# Patient Record
Sex: MALE | Race: BLACK OR AFRICAN AMERICAN | NOT HISPANIC OR LATINO | URBAN - METROPOLITAN AREA
[De-identification: names, ages, dates, MRNs, and addresses within clinical notes are randomized per-mention and may not be internally consistent; named-entity substitution may affect disease eponyms.]

---

## 2017-03-15 ENCOUNTER — EMERGENCY (EMERGENCY)
Facility: HOSPITAL | Age: 32
LOS: 1 days | Discharge: HOME | End: 2017-03-15
Attending: EMERGENCY MEDICINE | Admitting: EMERGENCY MEDICINE
Payer: COMMERCIAL

## 2017-03-15 VITALS
SYSTOLIC BLOOD PRESSURE: 152 MMHG | TEMPERATURE: 100 F | RESPIRATION RATE: 18 BRPM | WEIGHT: 164.91 LBS | DIASTOLIC BLOOD PRESSURE: 98 MMHG | OXYGEN SATURATION: 98 % | HEART RATE: 98 BPM

## 2017-03-15 VITALS
HEART RATE: 90 BPM | DIASTOLIC BLOOD PRESSURE: 86 MMHG | SYSTOLIC BLOOD PRESSURE: 144 MMHG | OXYGEN SATURATION: 99 % | TEMPERATURE: 98 F | RESPIRATION RATE: 18 BRPM

## 2017-03-15 DIAGNOSIS — R59.9 ENLARGED LYMPH NODES, UNSPECIFIED: ICD-10-CM

## 2017-03-15 DIAGNOSIS — R50.9 FEVER, UNSPECIFIED: ICD-10-CM

## 2017-03-15 LAB
ALBUMIN SERPL ELPH-MCNC: 3.8 G/DL — SIGNIFICANT CHANGE UP (ref 3.4–5)
ALP SERPL-CCNC: 100 U/L — SIGNIFICANT CHANGE UP (ref 40–120)
ALT FLD-CCNC: 65 U/L — HIGH (ref 12–42)
ANION GAP SERPL CALC-SCNC: 8 MMOL/L — LOW (ref 9–16)
APTT BLD: 33.8 SEC — SIGNIFICANT CHANGE UP (ref 27.5–37.4)
AST SERPL-CCNC: 35 U/L — SIGNIFICANT CHANGE UP (ref 15–37)
BILIRUB SERPL-MCNC: 0.5 MG/DL — SIGNIFICANT CHANGE UP (ref 0.2–1.2)
BLD GP AB SCN SERPL QL: NEGATIVE — SIGNIFICANT CHANGE UP
BUN SERPL-MCNC: 11 MG/DL — SIGNIFICANT CHANGE UP (ref 7–23)
CALCIUM SERPL-MCNC: 9.4 MG/DL — SIGNIFICANT CHANGE UP (ref 8.5–10.5)
CHLORIDE SERPL-SCNC: 101 MMOL/L — SIGNIFICANT CHANGE UP (ref 96–108)
CO2 SERPL-SCNC: 27 MMOL/L — SIGNIFICANT CHANGE UP (ref 22–31)
CREAT SERPL-MCNC: 1.17 MG/DL — SIGNIFICANT CHANGE UP (ref 0.5–1.3)
GLUCOSE SERPL-MCNC: 88 MG/DL — SIGNIFICANT CHANGE UP (ref 70–99)
HCT VFR BLD CALC: 44.2 % — SIGNIFICANT CHANGE UP (ref 39–50)
HGB BLD-MCNC: 14.9 G/DL — SIGNIFICANT CHANGE UP (ref 13–17)
INR BLD: 1.23 — HIGH (ref 0.88–1.16)
MCHC RBC-ENTMCNC: 26.8 PG — LOW (ref 27–34)
MCHC RBC-ENTMCNC: 33.7 G/DL — SIGNIFICANT CHANGE UP (ref 32–36)
MCV RBC AUTO: 79.5 FL — LOW (ref 80–100)
PLATELET # BLD AUTO: 208 K/UL — SIGNIFICANT CHANGE UP (ref 150–400)
POTASSIUM SERPL-MCNC: 4 MMOL/L — SIGNIFICANT CHANGE UP (ref 3.5–5.3)
POTASSIUM SERPL-SCNC: 4 MMOL/L — SIGNIFICANT CHANGE UP (ref 3.5–5.3)
PROT SERPL-MCNC: 8.2 G/DL — SIGNIFICANT CHANGE UP (ref 6.4–8.2)
PROTHROM AB SERPL-ACNC: 13.7 SEC — HIGH (ref 10–13.1)
RAPID RVP RESULT: SIGNIFICANT CHANGE UP
RBC # BLD: 5.56 M/UL — SIGNIFICANT CHANGE UP (ref 4.2–5.8)
RBC # FLD: 12.5 % — SIGNIFICANT CHANGE UP (ref 10.3–16.9)
RH IG SCN BLD-IMP: POSITIVE — SIGNIFICANT CHANGE UP
SODIUM SERPL-SCNC: 136 MMOL/L — SIGNIFICANT CHANGE UP (ref 135–145)
WBC # BLD: 8.2 K/UL — SIGNIFICANT CHANGE UP (ref 3.8–10.5)
WBC # FLD AUTO: 8.2 K/UL — SIGNIFICANT CHANGE UP (ref 3.8–10.5)

## 2017-03-15 PROCEDURE — 71020: CPT | Mod: 26

## 2017-03-15 PROCEDURE — 86900 BLOOD TYPING SEROLOGIC ABO: CPT

## 2017-03-15 PROCEDURE — 86901 BLOOD TYPING SEROLOGIC RH(D): CPT

## 2017-03-15 PROCEDURE — 99284 EMERGENCY DEPT VISIT MOD MDM: CPT | Mod: 25

## 2017-03-15 PROCEDURE — 87798 DETECT AGENT NOS DNA AMP: CPT

## 2017-03-15 PROCEDURE — 87633 RESP VIRUS 12-25 TARGETS: CPT

## 2017-03-15 PROCEDURE — 80053 COMPREHEN METABOLIC PANEL: CPT

## 2017-03-15 PROCEDURE — 74177 CT ABD & PELVIS W/CONTRAST: CPT | Mod: 26

## 2017-03-15 PROCEDURE — 74177 CT ABD & PELVIS W/CONTRAST: CPT

## 2017-03-15 PROCEDURE — 86850 RBC ANTIBODY SCREEN: CPT

## 2017-03-15 PROCEDURE — 87581 M.PNEUMON DNA AMP PROBE: CPT

## 2017-03-15 PROCEDURE — 71046 X-RAY EXAM CHEST 2 VIEWS: CPT

## 2017-03-15 PROCEDURE — 85610 PROTHROMBIN TIME: CPT

## 2017-03-15 PROCEDURE — 36415 COLL VENOUS BLD VENIPUNCTURE: CPT

## 2017-03-15 PROCEDURE — 85027 COMPLETE CBC AUTOMATED: CPT

## 2017-03-15 PROCEDURE — 87486 CHLMYD PNEUM DNA AMP PROBE: CPT

## 2017-03-15 PROCEDURE — 85730 THROMBOPLASTIN TIME PARTIAL: CPT

## 2017-03-15 RX ORDER — IBUPROFEN 200 MG
600 TABLET ORAL ONCE
Qty: 0 | Refills: 0 | Status: COMPLETED | OUTPATIENT
Start: 2017-03-15 | End: 2017-03-15

## 2017-03-15 RX ORDER — ACETAMINOPHEN 500 MG
650 TABLET ORAL ONCE
Qty: 0 | Refills: 0 | Status: COMPLETED | OUTPATIENT
Start: 2017-03-15 | End: 2017-03-15

## 2017-03-15 RX ORDER — IOHEXOL 300 MG/ML
50 INJECTION, SOLUTION INTRAVENOUS ONCE
Qty: 0 | Refills: 0 | Status: COMPLETED | OUTPATIENT
Start: 2017-03-15 | End: 2017-03-15

## 2017-03-15 RX ADMIN — Medication 600 MILLIGRAM(S): at 23:09

## 2017-03-15 RX ADMIN — Medication 600 MILLIGRAM(S): at 18:00

## 2017-03-15 RX ADMIN — Medication 100 MILLIGRAM(S): at 23:09

## 2017-03-15 RX ADMIN — IOHEXOL 50 MILLILITER(S): 300 INJECTION, SOLUTION INTRAVENOUS at 20:25

## 2017-03-15 RX ADMIN — Medication 650 MILLIGRAM(S): at 18:00

## 2017-03-15 NOTE — ED PROVIDER NOTE - MEDICAL DECISION MAKING DETAILS
31y male with flu-like symptoms - will check flu swab and CXR for infiltrate. R inguinal hernia noted - pt tolerates attempted reduction poorly, tylenol/motrin ordered, ice pack applied; will re-attempt. If unsuccesful, will CT.

## 2017-03-15 NOTE — ED ADULT NURSE NOTE - OBJECTIVE STATEMENT
Pt walked into ED with c/o of right inguinal 9/10 pain with a bulge noted. Pt also has a dry cough fever, chills. Onset was 2 days ago. PT. took tylenols PO last night with no relief noted. Denies hematuria, penile dx, blood in stool, dysuria.

## 2017-03-15 NOTE — ED PROVIDER NOTE - GASTROINTESTINAL, MLM
Abdomen soft, non-tender, no guarding. R groin: palpable hernia inguinal area, slight reduction with compression but pt tolerates procedure poorly (ice packed applied)

## 2017-03-15 NOTE — ED PROVIDER NOTE - PROGRESS NOTE DETAILS
CT noted. D/w pt results. Will check for STIs though pt denies risk factors Rec warm compresses, doxycycline, referrals coordinator to obtain PMD for further w/u of this.

## 2017-03-15 NOTE — ED PROVIDER NOTE - OBJECTIVE STATEMENT
31y male no PMH with 2d subjective fever, chills, cough, body aches. No flu shot this year. (+)sick conact - gf with URI. Also notes painful lump in right groin for past few days. No h/o hernia. No dysuria/penile DC.

## 2017-03-17 LAB
C TRACH RRNA SPEC QL NAA+PROBE: SIGNIFICANT CHANGE UP
N GONORRHOEA RRNA SPEC QL NAA+PROBE: SIGNIFICANT CHANGE UP
SPECIMEN SOURCE: SIGNIFICANT CHANGE UP

## 2020-10-06 NOTE — ED PROVIDER NOTE - CPE EDP MUSC NORM
Referred by: Kj Singh MD; Medical Diagnosis (from order):      Physical Therapy -  Daily Treatment Note    Visit:  2     SUBJECTIVE                                                                                                             Patient states that his left foot was doing good for a while because he wasn't on it as much. However, he's been more on his feet lately and its starting to act up again. Patient states he has not been doing his exercises as much as he should. He has been icing some.     Pain / Symptoms:  Pain rating (out of 10): Current: 5   Location: Left heel and arch     OBJECTIVE                                                                                                                       Muscle Flexibility:   - Plantar Flexors: Left: moderate limitation Supine ankle dorsiflexion: with knee extended: 2° with knee flexed: 10° Right: normal Supine ankle dorsiflexion: with knee extended: 10° with knee flexed: 12°      TREATMENT                                                                                                                  Therapeutic Exercise:  Seated great toe extension stretch: 35\" x 3 L  Seated arch cupping: 10 x 5\" bilaterally  Standing gastrocnemius stretch: 30\" x 2 L    Patient instructed in the importance of resuming his exercise program 3x/day.     Manual Therapy:  Soft tissue mobilization along left arch with patient in supine.     Therapeutic Activity:  Patient fitted for spenco inserts in work boots. Patient instructed to start wearing these for 2 hours and slowly increase wear time to 1-2 hours each day until he is wearing them for an entire work shift. Patient instructed to resume icing more consistently. He should use a frozen water bottle and roll along the bottom of his foot for 5 minutes at a time.     Skilled input: verbal instruction/cues, tactile instruction/cues and as detailed above  clinical decision-making and application of: manual  therapy    Writer verbally educated and received verbal consent for hand placement, positioning of patient, and techniques to be performed today from patient for clothing adjustments for techniques and hand placement and palpation for techniques as described above and how they are pertinent to the patient's plan of care.    Home Exercise Program: Access Code: [email or web address]   URL: [email or web address]   Date: 10/06/2020   Prepared by: Ramya Juarez      Program Notes   Freeze a water bottle and roll along arch of left foot for 5-10 minutes with sock on. Perform 3x/day. Ice back of left heel for 10 minutes, 3x/day.     Exercises Seated Plantar Fascia Stretch- 3 reps- 30 sec hold- 3x daily- 7x weekly   Seated Arch Lifts- 10 reps- 5-10 sec hold- 3x daily- 7x weekly   Gastroc Stretch on Wall- 2 reps- 30 sec hold- 3x daily- 7x weekly        ASSESSMENT                                                                                                             Patient responded very well to treatment with significant decrease in pain following. Patient's pain was located on the plantar surface of his heel today, therefore, this did not support achilles tendinitis.   Pain/symptoms after session: 1    Patient Education:   Results of above outlined education: Verbalizes understanding and Demonstrates understanding      PLAN                                                                                                                           Suggestions for next session as indicated: Progress per plan of care. Assess response to home exercise program and inserts. Continue with manual therapy and consider a modality (ultrasound or iontophoresis).          Procedures and total treatment time documented Time Entry flowsheet.   normal...

## 2022-05-18 NOTE — ED ADULT NURSE NOTE - NS ED NURSE LEVEL OF CONSCIOUSNESS MENTAL STATUS
[facility]  Occupational Therapy Treatment Note    Date: 5/18/2022  Name: Raffy Rutherford Jr.  Clinic Number: [id number]    Therapy Diagnosis:   Encounter Diagnoses   Name Primary?    Pain in thumb joint with movement of left hand Yes    Decreased activities of daily living (ADL)      Physician: Kaye Solis MD    Physician Orders: eval and treat, fabrication of forearm based thumb splint to immobilize the thumb in slight abduction, the MP thumb at 0 and the wrist at 30 degrees ext, 3x weekly therapy     Medical Diagnosis: Laceration of extensor muscle and tendon of thumb at wrist and hand level [S66.229A]  Surgical Procedure and Date: 04/07/2022: 1. Left extensor pollicis longus primary repair, zone T 3, cpt 74926  2. Left extensor pollicis brevis primary repair, zone T3, cpt 21550  3. Irrigation and excisional debridement left hand skin, subcutaneous tissue and tendon, cpt 90386  4. Complex wound exploration and closure, cpt 15964  Evaluation Date: 04/13/2022  Insurance Authorization Period Expiration: 12/31/2022  Plan of Care Expiration: 06/10/2022  Date of Return to MD: 04/21/2022  Visit # / Visits authorized: 12 / 20  FOTO: initial eval      Precautions:  Standard and extensor tendon precautions     Time In: 10am  Time Out: 1050am  Total Billable Time: 50minutes      SUBJECTIVE     Pt reports: its feeling good. I felt good with the cello.My IF is what bothers me the most.  He was compliant with home exercise program given last session.   Response to previous treatment:none reported  Functional change: none reported    Pain: 3/10  Location: left thumb    OBJECTIVE   Objective Measures updated at progress report unless specified.    Edema. Measured in centimeters.    4/13/2022     Left      Thumb:     Prox. Phalanx 7.5   IP 8.0   Distal Phalanx 7.6      Elbow and Wrist ROM. Measured in degrees.    4/13/2022     Left               Wrist Ext/Flex NT   Wrist RD/UD NT      Hand  ROM. Measured in degrees.    4/13/2022 05/09/2022 5/17/2022     Left  PROM extension  AROM flexion JAY Left AROM Left AROM post heat   Thumb: MP 0/ 0/40 0/43                IP 0+/ 0+/30 0/40       Rad ADD/ABD NT 40 40       Pal ADD/ABD NT 50 48          Opposition NT Tip of SF Tip of SF           Treatment     Raffy received the treatments listed below:     Supervised modalities after being cleared for contradictions: Fluidotherapy: To left hand  for 10 min, continuous air, 115 deg, air speed 50 to decrease pain, edema & scar tissue, sensory re- education, and increased tissue extensibility prior to therex        Manual therapy techniques:were applied to the: for 8 minutes, including:  -retrograde massage  -astim to Left IF volar and palm  -scar massage to incision      Therapeutic exercises to develop ROM and flexibility for 25 minutes, including:  - wrist flx/ext, RD/UD x 10 reps  -thumb.AROM: x 10 reps each   -thumb palmar and radial abduction              - IP flex/ext    -opposition             - thumb flex composite             - thumb lifts            -   - isospheres x 3 min  -  Thumb spool wheel x 1' CW, x 1CCW  - yellow CP x 3 pt pinch x lateral pinch x 2 containers each  - nuts and bolts x 1 container   -     Patient Education and Home Exercises      Education provided:   -   - Progress towards goals     Written Home Exercises Provided: Patient instructed to cont prior HEP.  Exercises were reviewed and Raffy was able to demonstrate them prior to the end of the session.  Raffy demonstrated good  understanding of the HEP provided. See EMR under Patient Instructions for exercises provided during therapy sessions.      ASSESSMENT     Pt would continue to benefit from skilled OT. . He is moving well. He has little pain. He is eager to get back to playing cello. Pt. Able to start 10 min practice session, ease into it.  IF continues to cause pain.   Raffy is progressing well towards his goals and there are  no updates to goals at this time. Pt prognosis is Good.     Pt will continue to benefit from skilled outpatient occupational therapy to address the deficits listed in the problem list on initial evaluation, provide pt/family education and to maximize pt's level of independence in the home and community environment.     Pt's spiritual, cultural and educational needs considered and pt agreeable to plan of care and goals.    Anticipated barriers to occupational therapy: none    Goals:  Goals:   The following goals were discussed with the patient and patient is in agreement with them as to be addressed in the treatment plan.   Long Term Goals (LTGs); to be met by discharge.  LTG #1: Pt will report a pain level of 0 out of 10 with ADLs - progressing    LTG #2: Pt will demo improved FOTO score by 20 points. - progressing   LTG #3: Pt will return to prior level of function for ADLs and household management. - progressing      Short Term Goals (STGs); to be met within 4 weeks (05/13/2022).  STG #1: Pt will report 3 out of 10 pain level with ADLs.- progressing   STG #2: Pt will report/demo [place] with playing cello.- progressing   STG #3: Pt will report/demo [place] with tying shoes.- progressing   STG #4: Pt will demonstrate independence with issued HEP. - progressing   STG #5: Pt will demo improved Thumb opposition to SF in order to complete playing music- progressing     PLAN     Continue skilled occupational therapy with individualized plan of care focusing on adhering to extensor tendon protocol    Updates/Grading for next session: cont per protocol ,     Jody Arnold, OTR/L,CHT                   Awake/Alert

## 2025-05-20 NOTE — ED ADULT NURSE NOTE - CHIEF COMPLAINT QUOTE
patient complains of cough chills and runny nose for 3 days. works as a .